# Patient Record
Sex: MALE | Race: WHITE | NOT HISPANIC OR LATINO | Employment: STUDENT | URBAN - METROPOLITAN AREA
[De-identification: names, ages, dates, MRNs, and addresses within clinical notes are randomized per-mention and may not be internally consistent; named-entity substitution may affect disease eponyms.]

---
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
## 2024-07-17 ENCOUNTER — TELEPHONE (OUTPATIENT)
Dept: ORTHOPEDIC SURGERY | Facility: CLINIC | Age: 19
End: 2024-07-17
Payer: MEDICAID

## 2024-07-17 NOTE — TELEPHONE ENCOUNTER
Caller: GALEN UPTON    Relationship to patient: PARENT    Best call back number:   [phone]    Chief complaint: LEFT KNEE    Type of visit: NEW PROB    Requested date: ASAP     If rescheduling, when is the original appointment: 7/24/24     Additional notes:PATIENT IS A  AND GOES BACK TO SCHOOL NEXT WEEK WOULD LKIKE TO GET SEEN SOONER IF POSSIBLE   
Can someone call this patient and see if he can come in on Monday 7/22/24?  
S/POWER AARON AND RESCHEDULED WITH DR. SALDIVAR ON 07/22/24  
done